# Patient Record
(demographics unavailable — no encounter records)

---

## 2024-09-26 NOTE — REVIEW OF SYSTEMS
[Fatigue] : fatigue [Joint Stiffness] : no joint stiffness [Skin Rash] : no skin rash [Negative] : Allergic/Immunologic [FreeTextEntry8] : burning

## 2024-09-26 NOTE — ASSESSMENT
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     Initial diagnosis of papillary TCC in 1994. He has positive pathology now despite recent BCG with intron.   Patient is former smoker with family h/o ovarian ca in his sister.  We reviewed NCCN guidelines and current options for localized and systemic treatment for superficial bladder cancer.  Based on Keynote- 057 patient with NMIBC were treated with pembrolizumab with 46% of patients experiencing a CR lasting at least 12 months.    Side effects and schema of the treatment explained to patient and his daughter and printed material were given.  Patient had recent COVID screen.  # Genetic  Family h/o ovarian cancer in his sister - Intivae testing showed KENZIE c.1648A.G VUS and HOXB13- c.308C>T US.  Reviewed with patient and daughter.   Pembro cycle 2- 9/1/2020- tolerates well.   9/2020-9/21- pembrolizumab  11/8/ 2021- 11/22- Atezolizumab   # Former Smoker x 35 years, quit 15 years ago.  -new lung nodule on PETCT  - hypermetabolic RLL paratracheal LN  - repeat imaging in 3 months cbc chem cea and tsh [With Patient/Caregiver] : With Patient/Caregiver [AdvancecareDate] : 06/20/2024 [Designated Health Care Proxy] : Designated Health Care Proxy [Name: ___] : Name: [unfilled] [Relationship: ___] : Relationship: [unfilled]

## 2024-09-26 NOTE — HISTORY OF PRESENT ILLNESS
[de-identified] : 75 year old male presents today for initial consultation of bladder cancer, referred by Dr. Ruiz.  \par  \par  Patient has had 2 BCG's over the past year.  Pathology dated 7/15/20- bladder neck, prostatic chips with in situ urothelial carcinoma with multiple foci of extension into prostatic duct and acini, no invasion into prostatic stroma identified.  Prostatic uretha: Prostatic chips with benign prostatic hyperplasia.  \par  \par   Here to discuss pembrolizumab therapy.  Patient is a former smoke, sister passed away in her 50' from ovarian cancer.   [de-identified] : Patient presents today for follow up of bladder cancer. Patient overall feels well with no new issues. He is still having the fatigue. He had a PET scan on June 7th, 2024.  His daughter state that he has been having memory loss lately. He saw  today.